# Patient Record
Sex: MALE | Race: WHITE | NOT HISPANIC OR LATINO | Employment: OTHER | URBAN - NONMETROPOLITAN AREA
[De-identification: names, ages, dates, MRNs, and addresses within clinical notes are randomized per-mention and may not be internally consistent; named-entity substitution may affect disease eponyms.]

---

## 2023-03-08 ENCOUNTER — OFFICE VISIT (OUTPATIENT)
Dept: CARDIOLOGY | Facility: CLINIC | Age: 58
End: 2023-03-08
Payer: MEDICARE

## 2023-03-08 VITALS
OXYGEN SATURATION: 92 % | SYSTOLIC BLOOD PRESSURE: 131 MMHG | BODY MASS INDEX: 26.26 KG/M2 | WEIGHT: 204.6 LBS | HEIGHT: 74 IN | DIASTOLIC BLOOD PRESSURE: 79 MMHG | HEART RATE: 103 BPM

## 2023-03-08 DIAGNOSIS — I10 ESSENTIAL HYPERTENSION: ICD-10-CM

## 2023-03-08 DIAGNOSIS — R07.2 PRECORDIAL PAIN: ICD-10-CM

## 2023-03-08 DIAGNOSIS — I49.1 PREMATURE ATRIAL COMPLEXES: ICD-10-CM

## 2023-03-08 DIAGNOSIS — F17.200 SMOKER: ICD-10-CM

## 2023-03-08 DIAGNOSIS — R06.02 SHORTNESS OF BREATH: Primary | ICD-10-CM

## 2023-03-08 DIAGNOSIS — E78.2 MIXED HYPERLIPIDEMIA: ICD-10-CM

## 2023-03-08 DIAGNOSIS — R60.0 BILATERAL LEG EDEMA: ICD-10-CM

## 2023-03-08 DIAGNOSIS — R00.2 PALPITATIONS: ICD-10-CM

## 2023-03-08 PROBLEM — J44.9 COPD (CHRONIC OBSTRUCTIVE PULMONARY DISEASE): Status: ACTIVE | Noted: 2023-03-08

## 2023-03-08 PROBLEM — K21.9 GERD (GASTROESOPHAGEAL REFLUX DISEASE): Status: ACTIVE | Noted: 2023-03-08

## 2023-03-08 PROCEDURE — 99204 OFFICE O/P NEW MOD 45 MIN: CPT | Performed by: SPECIALIST

## 2023-03-08 PROCEDURE — 3075F SYST BP GE 130 - 139MM HG: CPT | Performed by: SPECIALIST

## 2023-03-08 PROCEDURE — 93000 ELECTROCARDIOGRAM COMPLETE: CPT | Performed by: SPECIALIST

## 2023-03-08 PROCEDURE — 3078F DIAST BP <80 MM HG: CPT | Performed by: SPECIALIST

## 2023-03-08 RX ORDER — CETIRIZINE HYDROCHLORIDE 10 MG/1
10 TABLET ORAL DAILY
COMMUNITY

## 2023-03-08 RX ORDER — PREDNISONE 1 MG/1
5 TABLET ORAL DAILY PRN
COMMUNITY
Start: 2023-02-10

## 2023-03-08 RX ORDER — OMEPRAZOLE 40 MG/1
CAPSULE, DELAYED RELEASE ORAL DAILY
COMMUNITY
Start: 2023-03-02

## 2023-03-08 RX ORDER — ERGOCALCIFEROL 1.25 MG/1
CAPSULE ORAL WEEKLY
COMMUNITY
Start: 2023-02-10

## 2023-03-08 RX ORDER — IPRATROPIUM/ALBUTEROL SULFATE 20-100 MCG
MIST INHALER (GRAM) INHALATION 4 TIMES DAILY
COMMUNITY

## 2023-03-08 RX ORDER — BUPROPION HYDROCHLORIDE 100 MG/1
100 TABLET, EXTENDED RELEASE ORAL 2 TIMES DAILY
Qty: 180 TABLET | Refills: 1 | Status: SHIPPED | OUTPATIENT
Start: 2023-03-08

## 2023-03-08 RX ORDER — ALBUTEROL SULFATE 2.5 MG/3ML
2.5 SOLUTION RESPIRATORY (INHALATION) EVERY 4 HOURS PRN
COMMUNITY
End: 2023-03-08

## 2023-03-08 RX ORDER — LOSARTAN POTASSIUM AND HYDROCHLOROTHIAZIDE 12.5; 1 MG/1; MG/1
TABLET ORAL DAILY
COMMUNITY
Start: 2023-03-02

## 2023-03-08 RX ORDER — IPRATROPIUM BROMIDE AND ALBUTEROL SULFATE 2.5; .5 MG/3ML; MG/3ML
SOLUTION RESPIRATORY (INHALATION) 3 TIMES DAILY PRN
COMMUNITY
Start: 2023-03-02 | End: 2023-03-08 | Stop reason: ALTCHOICE

## 2023-03-08 RX ORDER — SILDENAFIL CITRATE 20 MG/1
20 TABLET ORAL
COMMUNITY

## 2023-03-08 RX ORDER — ALBUTEROL SULFATE 90 UG/1
2 AEROSOL, METERED RESPIRATORY (INHALATION) EVERY 4 HOURS PRN
COMMUNITY

## 2023-03-08 RX ORDER — ROSUVASTATIN CALCIUM 20 MG/1
20 TABLET, COATED ORAL DAILY
Qty: 90 TABLET | Refills: 3 | Status: SHIPPED | OUTPATIENT
Start: 2023-03-08

## 2023-03-08 RX ORDER — FLUTICASONE FUROATE, UMECLIDINIUM BROMIDE AND VILANTEROL TRIFENATATE 100; 62.5; 25 UG/1; UG/1; UG/1
1 POWDER RESPIRATORY (INHALATION) DAILY
COMMUNITY
Start: 2023-01-31

## 2023-03-08 NOTE — PROGRESS NOTES
Subjective   Initial consultation, shortness of breath, chest pain, palpitations  Andrew Mcmahon is a 57 y.o. male who presents to day for Establish Care (Here for eval. ), Chest Pain, Shortness of Breath, Hyperlipidemia, and Hypertension.    CHIEF COMPLIANT  Chief Complaint   Patient presents with   • Establish Care     Here for eval.    • Chest Pain   • Shortness of Breath   • Hyperlipidemia   • Hypertension     Problem List:    1. HTN  2. Shortness of breath  3. GERD  4. COPD  5. Hyperlipidemia  6. Smoker    Problem List Items Addressed This Visit        Cardiac and Vasculature    Essential hypertension    Relevant Medications    losartan-hydrochlorothiazide (HYZAAR) 100-12.5 MG per tablet    Other Relevant Orders    ECG 12 Lead    Mixed hyperlipidemia    Relevant Medications    rosuvastatin (CRESTOR) 20 MG tablet    Palpitations    Relevant Orders    Adult Transthoracic Echo Complete w/ Color, Spectral and Contrast if necessary per protocol    Cardiac Event Monitor    Premature atrial complexes    Relevant Medications    sildenafil (REVATIO) 20 MG tablet       Pulmonary and Pneumonias    Shortness of breath - Primary    Relevant Orders    ECG 12 Lead    Stress Test With Myocardial Perfusion One Day    Adult Transthoracic Echo Complete w/ Color, Spectral and Contrast if necessary per protocol    Ambulatory Referral to Pulmonology       Symptoms and Signs    Bilateral leg edema    Relevant Orders    Stress Test With Myocardial Perfusion One Day    Adult Transthoracic Echo Complete w/ Color, Spectral and Contrast if necessary per protocol       Tobacco    Smoker    Relevant Medications    buPROPion SR (Wellbutrin SR) 100 MG 12 hr tablet   Other Visit Diagnoses     Precordial pain        Relevant Orders    ECG 12 Lead    Stress Test With Myocardial Perfusion One Day    Adult Transthoracic Echo Complete w/ Color, Spectral and Contrast if necessary per protocol          HPI  Patient has been having shortness of breath  for years but since he had COVID about 2 years ago this has been progressing steadily, now he can maybe walk half a block quite short of breath class III with intermittent pedal edema he also gets chest discomfort and tightness but not with exertion he could be seated but he notices that if he is more short of breath he will get the chest discomfort too, he also occasionally feels his heart is beating and pounding he denied skipping  specifically, he smoked for little bit over 40 years about 2 packs/day he has history of hypertension and he is prediabetic also has hyperlipidemia, no significant cardiac family history except few uncles from both paternal and maternal side with heart attacks also his mother had valve replacement for a leaky valve                    PRIOR MEDS  Current Outpatient Medications on File Prior to Visit   Medication Sig Dispense Refill   • albuterol sulfate  (90 Base) MCG/ACT inhaler Inhale 2 puffs Every 4 (Four) Hours As Needed for Wheezing.     • cetirizine (zyrTEC) 10 MG tablet Take 1 tablet by mouth Daily.     • ipratropium-albuterol (Combivent Respimat)  MCG/ACT inhaler 4 (Four) Times a Day.     • losartan-hydrochlorothiazide (HYZAAR) 100-12.5 MG per tablet Daily.     • omeprazole (priLOSEC) 40 MG capsule Daily.     • predniSONE (DELTASONE) 5 MG tablet Take 1 tablet by mouth Daily As Needed.     • sildenafil (REVATIO) 20 MG tablet Take 1 tablet by mouth. For ED     • vitamin D (ERGOCALCIFEROL) 1.25 MG (95147 UT) capsule capsule 1 (One) Time Per Week.     • [DISCONTINUED] albuterol (PROVENTIL) (2.5 MG/3ML) 0.083% nebulizer solution Take 2.5 mg by nebulization Every 4 (Four) Hours As Needed.     • Trelegy Ellipta 100-62.5-25 MCG/ACT inhaler 1 puff Daily.     • [DISCONTINUED] ipratropium-albuterol (DUO-NEB) 0.5-2.5 mg/3 ml nebulizer 3 (Three) Times a Day As Needed.       No current facility-administered medications on file prior to visit.       ALLERGIES  Codeine, Nyquil hbp  "cold & flu [dm-doxylamine-acetaminophen], and Doxycycline    HISTORY  Past Medical History:   Diagnosis Date   • Borderline diabetes    • COPD (chronic obstructive pulmonary disease) (HCC)    • Edema of both lower extremities    • Essential hypertension    • GERD (gastroesophageal reflux disease)    • Hyperlipidemia    • Shortness of breath        Social History     Socioeconomic History   • Marital status:    Tobacco Use   • Smoking status: Every Day     Types: Cigarettes   • Smokeless tobacco: Former   • Tobacco comments:     Smoking approx. 2 PPD since approx. 7 yrs. old   Substance and Sexual Activity   • Alcohol use: Never   • Drug use: Never       Family History   Problem Relation Age of Onset   • Hypertension Mother    • Valvular heart disease Mother    • Skin cancer Mother    • No Known Problems Father        Review of Systems   Constitutional: Positive for fatigue (off and on).   HENT: Negative.    Eyes: Visual disturbance: glasses prn.   Respiratory: Positive for shortness of breath (HX COPD).    Cardiovascular: Positive for chest pain and leg swelling (feet/toes). Negative for palpitations.   Gastrointestinal: Negative.    Endocrine: Negative.    Genitourinary: Negative.    Musculoskeletal: Positive for arthralgias and myalgias.   Skin: Negative.    Allergic/Immunologic: Positive for environmental allergies.   Neurological: Negative.    Hematological: Bruises/bleeds easily.   Psychiatric/Behavioral: Positive for sleep disturbance.       Objective     VITALS: /79 (BP Location: Left arm, Patient Position: Sitting)   Pulse 103   Ht 186.9 cm (73.6\")   Wt 92.8 kg (204 lb 9.6 oz)   SpO2 92%   BMI 26.56 kg/m²     LABS:   Lab Results (most recent)     None          IMAGING:   No Images in the past 120 days found..    EXAM:  Physical Exam  Constitutional:       Appearance: He is well-developed.   HENT:      Head: Normocephalic and atraumatic.   Eyes:      Pupils: Pupils are equal, round, and " reactive to light.   Neck:      Thyroid: No thyromegaly.      Vascular: No JVD.   Cardiovascular:      Rate and Rhythm: Normal rate and regular rhythm.      Chest Wall: PMI is not displaced.      Pulses: Normal pulses.      Heart sounds: Normal heart sounds, S1 normal and S2 normal. No murmur heard.    No friction rub. No gallop.   Pulmonary:      Effort: Pulmonary effort is normal. No respiratory distress.      Breath sounds: Normal breath sounds. No stridor. No wheezing or rales.   Chest:      Chest wall: No tenderness.   Abdominal:      General: Bowel sounds are normal. There is no distension.      Palpations: Abdomen is soft. There is no mass.      Tenderness: There is no abdominal tenderness. There is no guarding or rebound.   Musculoskeletal:      Cervical back: Neck supple. No edema.   Skin:     General: Skin is warm and dry.      Coloration: Skin is not pale.      Findings: No erythema or rash.   Neurological:      Mental Status: He is alert and oriented to person, place, and time.      Cranial Nerves: No cranial nerve deficit.      Coordination: Coordination normal.   Psychiatric:         Behavior: Behavior normal.         Procedure     ECG 12 Lead    Date/Time: 3/8/2023 1:58 PM  Performed by: Chrissie Swain MD  Authorized by: Chrissie Swain MD   Comparison: not compared with previous ECG   Previous ECG: no previous ECG available          EKG sinus tachycardia with heart rate of 106 with right axis deviation and septal infarction pattern with frequent premature atrial complexes no previous EKGs available for comparison       Assessment & Plan     Diagnoses and all orders for this visit:    1. Shortness of breath (Primary)  -     ECG 12 Lead  -     Stress Test With Myocardial Perfusion One Day; Future  -     Adult Transthoracic Echo Complete w/ Color, Spectral and Contrast if necessary per protocol; Future  -     Ambulatory Referral to Pulmonology    2. Essential hypertension  -     ECG 12 Lead    3. Mixed  hyperlipidemia  -     rosuvastatin (CRESTOR) 20 MG tablet; Take 1 tablet by mouth Daily.  Dispense: 90 tablet; Refill: 3    4. Bilateral leg edema  -     Stress Test With Myocardial Perfusion One Day; Future  -     Adult Transthoracic Echo Complete w/ Color, Spectral and Contrast if necessary per protocol; Future    5. Smoker  -     buPROPion SR (Wellbutrin SR) 100 MG 12 hr tablet; Take 1 tablet by mouth 2 (Two) Times a Day.  Dispense: 180 tablet; Refill: 1    6. Precordial pain  -     ECG 12 Lead  -     Stress Test With Myocardial Perfusion One Day; Future  -     Adult Transthoracic Echo Complete w/ Color, Spectral and Contrast if necessary per protocol; Future    7. Palpitations  -     Adult Transthoracic Echo Complete w/ Color, Spectral and Contrast if necessary per protocol; Future  -     Cardiac Event Monitor; Future    8. Premature atrial complexes    1.  He is quite short of breath I suspect combination of significant COPD plus or minus cardiac cause including possible ischemia some going to go ahead and do a stress testing for assessment of ischemia also get an echocardiogram to assess cardiac function wall motion and valve morphology  2.  He has frequent PACs the EKG with right axis deviation consistent with pulmonary pressure issues so I am going to get an event monitor also I am going to refer him to pulmonology for assessment of his lung functions  3.  I reviewed his labs from his primary care physician which was done back on February 1 and that showed significantly elevated LDL of 158 with HDL of 55 and triglycerides of 105 I am going to start him on rosuvastatin 20 mg/day  4.  Currently no clinical CHF continue current management  5.  His blood pressures controlled continue current management  6.  I reviewed his hemoglobin A1c was 6.3 he is prediabetic we talked about dieting and cutting down carbohydrate intake but also suggested he should talk to his primary caregiver about taking metformin as he is  quite close to develop full diabetes  7.  Strongly advised him to quit smoking immediately I started him on Wellbutrin    Return After stress test.                 MEDS ORDERED DURING VISIT:  New Medications Ordered This Visit   Medications   • buPROPion SR (Wellbutrin SR) 100 MG 12 hr tablet     Sig: Take 1 tablet by mouth 2 (Two) Times a Day.     Dispense:  180 tablet     Refill:  1   • rosuvastatin (CRESTOR) 20 MG tablet     Sig: Take 1 tablet by mouth Daily.     Dispense:  90 tablet     Refill:  3       As always, Diogo Lynn, NP-C  I appreciate very much the opportunity to participate in the cardiovascular care of your patients. Please do not hesitate to call me with any questions with regards to Andrew Mcmahon evaluation and management.         This document has been electronically signed by Chrissie Swain MD  March 8, 2023 14:50 EST    This note is dictated utilizing voice recognition software.

## 2023-03-09 ENCOUNTER — TELEPHONE (OUTPATIENT)
Dept: CARDIOLOGY | Facility: CLINIC | Age: 58
End: 2023-03-09
Payer: MEDICARE

## 2023-03-09 NOTE — TELEPHONE ENCOUNTER
Patient called reporting his monitor has been beeping with poor skin contact. He has used all but 2 of the monitor strips trying to get it to work. Advised he can come by the office for assistance and extra strips or he can call the 800 number on the box to have strips mailed. He may try repositioning the monitor across left breast like instruction book shows ensuring the arrow is pointing up. He if continues to have problems he will come in office for assistance.

## 2023-03-10 ENCOUNTER — PATIENT ROUNDING (BHMG ONLY) (OUTPATIENT)
Dept: CARDIOLOGY | Facility: CLINIC | Age: 58
End: 2023-03-10
Payer: MEDICARE

## 2023-03-10 NOTE — PROGRESS NOTES
March 10, 2023    Hello, may I speak with Andrew Mcmahon?    My name is KATYA CUNHA      [facility] am  with [facility]  [address] [place] [id number] [phone].    Before we get started may I verify your date of birth? 1965    I am calling to officially welcome you to our practice and ask about your recent visit. Is this a good time to talk? yes    Tell me about your visit with us. What things went well?  [employer]       We're always looking for ways to make our patients' experiences even better. Do you have recommendations on ways we may improve?  no.  ALMA ROSA DONE A WONDERFUL JOB    Overall were you satisfied with your first visit to our practice? yes       I appreciate you taking the time to speak with me today. Is there anything else I can do for you? Yes.  PT IS HAVING DIFFICULTY WEARING THE MONITOR.        Thank you, and have a great day.

## 2023-03-13 ENCOUNTER — TELEPHONE (OUTPATIENT)
Dept: CARDIOLOGY | Facility: CLINIC | Age: 58
End: 2023-03-13
Payer: MEDICARE

## 2023-03-13 NOTE — TELEPHONE ENCOUNTER
Caller: Andrew Mcmahon    Relationship: Self    Best call back number: [phone]    What is the best time to reach you: ANY    Who are you requesting to speak with (clinical staff, provider,  specific staff member): CLINICAL    What was the call regarding: PT CALLED ON 03.09.23 AND SPOKE WITH [facility] WITH CLINICAL TEAM RE HIS MONITOR.PT CALLING AGAIN AS ISSUE WAS NOT RESOLVED. SKIN ALSO BROKE OUT WITH ADHESIVE USE. HUB STAFF ADVISED TO KEEP MONITOR AT HOME UNTIL HE IS ADVISED ON NEXT STEPS BY CLINICAL STAFF.     Do you require a callback: YES

## 2023-03-13 NOTE — TELEPHONE ENCOUNTER
Patient left message that he took his monitor off and wanted to know what to do with it. Called patient, he states he is still having problems with poor skin contact and now monitor is irritating his skin. He called [employer] but was told there was nothing they could do. He did not want to come in for assistance or try sensitive strips. He was advised to send monitor back.

## 2023-03-13 NOTE — TELEPHONE ENCOUNTER
I did offer sensitive strips, pt states he is too far away to make the trip. He stated he did not want to continue to wear.

## 2023-04-04 ENCOUNTER — HOSPITAL ENCOUNTER (OUTPATIENT)
Dept: CARDIOLOGY | Facility: HOSPITAL | Age: 58
Discharge: HOME | End: 2023-04-04
Payer: MEDICARE

## 2023-04-04 VITALS — WEIGHT: 204.59 LBS | BODY MASS INDEX: 27.11 KG/M2 | HEIGHT: 73 IN

## 2023-04-04 DIAGNOSIS — R07.2 PRECORDIAL PAIN: ICD-10-CM

## 2023-04-04 DIAGNOSIS — R00.2 PALPITATIONS: ICD-10-CM

## 2023-04-04 DIAGNOSIS — R06.02 SHORTNESS OF BREATH: ICD-10-CM

## 2023-04-04 DIAGNOSIS — R60.0 BILATERAL LEG EDEMA: ICD-10-CM

## 2023-04-04 LAB
AORTIC DIMENSIONLESS INDEX: 1 (DI)
BH CV ECHO MEAS - ACS: 1.93 CM
BH CV ECHO MEAS - AO MAX PG: 3.6 MMHG
BH CV ECHO MEAS - AO MEAN PG: 1.97 MMHG
BH CV ECHO MEAS - AO ROOT DIAM: 2.9 CM
BH CV ECHO MEAS - AO V2 MAX: 94.7 CM/SEC
BH CV ECHO MEAS - AO V2 VTI: 15.7 CM
BH CV ECHO MEAS - EDV(CUBED): 106.7 ML
BH CV ECHO MEAS - ESV(CUBED): 33.5 ML
BH CV ECHO MEAS - FS: 32 %
BH CV ECHO MEAS - IVS/LVPW: 1.07 CM
BH CV ECHO MEAS - IVSD: 0.87 CM
BH CV ECHO MEAS - LA DIMENSION: 2.5 CM
BH CV ECHO MEAS - LAT PEAK E' VEL: 7 CM/SEC
BH CV ECHO MEAS - LV MASS(C)D: 132.8 GRAMS
BH CV ECHO MEAS - LV MAX PG: 4 MMHG
BH CV ECHO MEAS - LV MEAN PG: 1.98 MMHG
BH CV ECHO MEAS - LV V1 MAX: 100.3 CM/SEC
BH CV ECHO MEAS - LV V1 VTI: 19.9 CM
BH CV ECHO MEAS - LVIDD: 4.7 CM
BH CV ECHO MEAS - LVIDS: 3.2 CM
BH CV ECHO MEAS - LVPWD: 0.81 CM
BH CV ECHO MEAS - MED PEAK E' VEL: 6.3 CM/SEC
BH CV ECHO MEAS - MV A MAX VEL: 53.4 CM/SEC
BH CV ECHO MEAS - MV DEC SLOPE: 296.5 CM/SEC2
BH CV ECHO MEAS - MV DEC TIME: 0.21 MSEC
BH CV ECHO MEAS - MV E MAX VEL: 39.3 CM/SEC
BH CV ECHO MEAS - MV E/A: 0.74
BH CV ECHO MEAS - MV MAX PG: 3.2 MMHG
BH CV ECHO MEAS - MV MEAN PG: 1.23 MMHG
BH CV ECHO MEAS - MV P1/2T: 63.9 MSEC
BH CV ECHO MEAS - MV V2 VTI: 18.9 CM
BH CV ECHO MEAS - MVA(P1/2T): 3.4 CM2
BH CV ECHO MEAS - PA V2 MAX: 99.5 CM/SEC
BH CV ECHO MEAS - RAP SYSTOLE: 8 MMHG
BH CV ECHO MEAS - RV MAX PG: 2.5 MMHG
BH CV ECHO MEAS - RV V1 MAX: 79.6 CM/SEC
BH CV ECHO MEAS - RV V1 VTI: 15.2 CM
BH CV ECHO MEAS - RVDD: 2.7 CM
BH CV ECHO MEAS - RVSP: 13 MMHG
BH CV ECHO MEAS - TAPSE (>1.6): 2.24 CM
BH CV ECHO MEAS - TR MAX PG: 4.7 MMHG
BH CV ECHO MEAS - TR MAX VEL: 108.2 CM/SEC
BH CV ECHO MEASUREMENTS AVERAGE E/E' RATIO: 5.91
BH CV REST NUCLEAR ISOTOPE DOSE: 10 MCI
BH CV STRESS COMMENTS STAGE 1: NORMAL
BH CV STRESS DOSE REGADENOSON STAGE 1: 0.4
BH CV STRESS DURATION MIN STAGE 1: 0
BH CV STRESS DURATION SEC STAGE 1: 10
BH CV STRESS NUCLEAR ISOTOPE DOSE: 30 MCI
BH CV STRESS PROTOCOL 1: NORMAL
BH CV STRESS RECOVERY BP: NORMAL MMHG
BH CV STRESS RECOVERY HR: 113 BPM
BH CV STRESS STAGE 1: 1
BH CV XLRA - TDI S': 9.7 CM/SEC
LV EF 2D ECHO EST: 60 %
LV EF NUC BP: 62 %
MAXIMAL PREDICTED HEART RATE: 161 BPM
MAXIMAL PREDICTED HEART RATE: 161 BPM
PERCENT MAX PREDICTED HR: 68.32 %
SINUS: 3.2 CM
STRESS BASELINE BP: NORMAL MMHG
STRESS BASELINE HR: 104 BPM
STRESS PERCENT HR: 80 %
STRESS POST PEAK BP: NORMAL MMHG
STRESS POST PEAK HR: 110 BPM
STRESS TARGET HR: 137 BPM
STRESS TARGET HR: 137 BPM

## 2023-04-04 PROCEDURE — 0 TECHNETIUM SESTAMIBI: Performed by: SPECIALIST

## 2023-04-04 PROCEDURE — A9500 TC99M SESTAMIBI: HCPCS | Performed by: SPECIALIST

## 2023-04-04 PROCEDURE — 25010000002 REGADENOSON 0.4 MG/5ML SOLUTION: Performed by: SPECIALIST

## 2023-04-04 PROCEDURE — 93306 TTE W/DOPPLER COMPLETE: CPT | Performed by: SPECIALIST

## 2023-04-04 PROCEDURE — 93018 CV STRESS TEST I&R ONLY: CPT | Performed by: SPECIALIST

## 2023-04-04 PROCEDURE — 93306 TTE W/DOPPLER COMPLETE: CPT

## 2023-04-04 PROCEDURE — 78452 HT MUSCLE IMAGE SPECT MULT: CPT

## 2023-04-04 PROCEDURE — 78452 HT MUSCLE IMAGE SPECT MULT: CPT | Performed by: SPECIALIST

## 2023-04-04 PROCEDURE — 93017 CV STRESS TEST TRACING ONLY: CPT

## 2023-04-04 RX ADMIN — TECHNETIUM TC 99M SESTAMIBI 1 DOSE: 1 INJECTION INTRAVENOUS at 08:15

## 2023-04-04 RX ADMIN — TECHNETIUM TC 99M SESTAMIBI 1 DOSE: 1 INJECTION INTRAVENOUS at 09:47

## 2023-04-04 RX ADMIN — REGADENOSON 0.4 MG: 0.08 INJECTION, SOLUTION INTRAVENOUS at 09:46

## 2023-05-11 ENCOUNTER — OFFICE VISIT (OUTPATIENT)
Dept: PULMONOLOGY | Facility: CLINIC | Age: 58
End: 2023-05-11
Payer: MEDICARE

## 2023-05-11 ENCOUNTER — PATIENT ROUNDING (BHMG ONLY) (OUTPATIENT)
Dept: PULMONOLOGY | Facility: CLINIC | Age: 58
End: 2023-05-11
Payer: MEDICARE

## 2023-05-11 VITALS
HEIGHT: 73 IN | HEART RATE: 99 BPM | BODY MASS INDEX: 26.24 KG/M2 | WEIGHT: 198 LBS | OXYGEN SATURATION: 93 % | DIASTOLIC BLOOD PRESSURE: 77 MMHG | SYSTOLIC BLOOD PRESSURE: 134 MMHG

## 2023-05-11 DIAGNOSIS — R06.02 SHORTNESS OF BREATH: Primary | ICD-10-CM

## 2023-05-11 DIAGNOSIS — F17.218 CIGARETTE NICOTINE DEPENDENCE WITH OTHER NICOTINE-INDUCED DISORDER: ICD-10-CM

## 2023-05-11 DIAGNOSIS — Z71.6 ENCOUNTER FOR SMOKING CESSATION COUNSELING: ICD-10-CM

## 2023-05-11 DIAGNOSIS — J44.9 CHRONIC OBSTRUCTIVE PULMONARY DISEASE, UNSPECIFIED COPD TYPE: ICD-10-CM

## 2023-05-11 DIAGNOSIS — Z12.2 ENCOUNTER FOR SCREENING FOR LUNG CANCER: ICD-10-CM

## 2023-05-11 RX ORDER — VARENICLINE TARTRATE 1 MG/1
1 TABLET, FILM COATED ORAL 2 TIMES DAILY
Qty: 60 TABLET | Refills: 4 | Status: SHIPPED | OUTPATIENT
Start: 2023-05-11

## 2023-05-11 RX ORDER — VARENICLINE TARTRATE 25 MG
KIT ORAL
Qty: 53 TABLET | Refills: 0 | Status: SHIPPED | OUTPATIENT
Start: 2023-05-11

## 2023-05-11 RX ORDER — BLOOD-GLUCOSE METER
EACH MISCELLANEOUS
COMMUNITY
Start: 2023-03-17

## 2023-05-11 RX ORDER — LEVOFLOXACIN 500 MG/1
TABLET, FILM COATED ORAL
COMMUNITY
Start: 2023-03-09 | End: 2023-05-17

## 2023-05-11 NOTE — PROGRESS NOTES
New Pulmonary Patient Office Visit      Patient Name: Andrew Mcmahon    Referring Physician: Chrissie Swain MD    Chief Complaint:  Short of breath      History of Present Illness: Andrew Mcmahon is a 58 y.o. male who is here today to establish care with Pulmonary for shortness of breath, referred by cardiology. Home pulse ox drops to the 80s per patient report. + chronic right knee pain from prior injury limits mobility, ambulates with a cane. He was started on Trelegy by his PCP, just began using the medication today. Used Wellbutrin in the past but stopped because it made him sleepy. He has nicotine patches at home but hasn't been using them.     Duration: Dyspnea for a couple of years, was told that he had COPD 3-4 years ago  Severity: Can be severe  Timing: Daily  Context: Mild exertion  Associated Symptoms: Occasional cough with procution of phlegm, some wheezing, no fevers, no hemoptysis  Modifying Factors: Worse with exertion  Supplemental Oxygen: No   Ever had Blood Clots: No  Last Steroids: February 2023  Number of exacerbations per year: 4  Smoking history: 2 ppd, started at age 7, now cut back to 1.5 ppd    Subjective      Review of Systems:   Review of Systems   Constitutional: Negative for fever and unexpected weight change.   Respiratory: Positive for cough, shortness of breath and wheezing.    Cardiovascular: Negative for chest pain and leg swelling.   Musculoskeletal: Positive for arthralgias.        Past Medical History:   Past Medical History:   Diagnosis Date   • Borderline diabetes    • COPD (chronic obstructive pulmonary disease)    • Edema of both lower extremities    • Essential hypertension    • GERD (gastroesophageal reflux disease)    • Hyperlipidemia    • Shortness of breath        Past Surgical History:   Past Surgical History:   Procedure Laterality Date   • APPENDECTOMY     • CATARACT EXTRACTION     • CHOLECYSTECTOMY     • LEG SURGERY      x3   • STOMACH SURGERY      ulcer  "      Family History:   Family History   Problem Relation Age of Onset   • Hypertension Mother    • Valvular heart disease Mother    • Skin cancer Mother    • No Known Problems Father        Social History:   Social History     Socioeconomic History   • Marital status:    Tobacco Use   • Smoking status: Every Day     Types: Cigarettes   • Smokeless tobacco: Former   • Tobacco comments:     Smoking approx. 2 PPD since approx. 7 yrs. old   Substance and Sexual Activity   • Alcohol use: Never   • Drug use: Never       Medications:     Current Outpatient Medications:   •  albuterol sulfate  (90 Base) MCG/ACT inhaler, Inhale 2 puffs Every 4 (Four) Hours As Needed for Wheezing., Disp: , Rfl:   •  Blood Glucose Monitoring Suppl (ONE TOUCH ULTRA 2) w/Device kit, , Disp: , Rfl:   •  cetirizine (zyrTEC) 10 MG tablet, Take 1 tablet by mouth Daily., Disp: , Rfl:   •  ipratropium-albuterol (Combivent Respimat)  MCG/ACT inhaler, 4 (Four) Times a Day., Disp: , Rfl:   •  losartan-hydrochlorothiazide (HYZAAR) 100-12.5 MG per tablet, Daily., Disp: , Rfl:   •  omeprazole (priLOSEC) 40 MG capsule, Daily., Disp: , Rfl:   •  rosuvastatin (CRESTOR) 20 MG tablet, Take 1 tablet by mouth Daily., Disp: 90 tablet, Rfl: 3  •  sildenafil (REVATIO) 20 MG tablet, Take 1 tablet by mouth. For ED, Disp: , Rfl:   •  Trelegy Ellipta 100-62.5-25 MCG/ACT inhaler, 1 puff Daily., Disp: , Rfl:   •  vitamin D (ERGOCALCIFEROL) 1.25 MG (94053 UT) capsule capsule, 1 (One) Time Per Week., Disp: , Rfl:     Allergies:   Allergies   Allergen Reactions   • Codeine Hives   • Nyquil Hbp Cold & Flu [Dm-Doxylamine-Acetaminophen] Hives   • Doxycycline Rash       Objective     Physical Exam:  Vital Signs:   Vitals:    05/11/23 1311   BP: 134/77   BP Location: Left arm   Patient Position: Sitting   Cuff Size: Adult   Pulse: 99   SpO2: 93%   Weight: 89.8 kg (198 lb)   Height: 185.4 cm (73\")     Body mass index is 26.12 " kg/m².  ============================  ============================    6 MINUTE WALK TEST    Andrew Mcmahon   1965             BASELINE   SpO2%: 93% RA    Heart Rate 99   Blood Pressure 134/77     EXERCISE SpO2% HEART RATE RA or O2 @ LPM   1 MINUTE 93 99 RA   2 MINUTES 93 106 RA   3 MINUTES 90 103 RA   4 MINUTES 88 110 RA   5 MINUTES 97 93 2 LPM   6 MINUTES 96 86 2 LPM   (Number of laps: 5  X 36 meters + Final partial lap: meters =  meters)            Distance Walked: 180 Meters   SpO2% Post Exercise: 96%   HR Post Exercise: 98     Reason to stop (if applicable):   ____ Chest Pain   ____ Light Headedness   ____ Dyspnea Unrelieved by Rest   ____ Abnormal Gait Pattern   ____ Severe Fatigue   ____ Other (Specify: __________________________)    Tech Comments (if any):     Test performed by: AM    ============================  ============================    Physical Exam  Vitals reviewed.   Constitutional:       General: He is not in acute distress.     Appearance: He is not toxic-appearing.   HENT:      Head: Normocephalic and atraumatic.      Right Ear: External ear normal.      Left Ear: External ear normal.      Nose: Nose normal.      Mouth/Throat:      Mouth: Mucous membranes are moist.   Eyes:      Extraocular Movements: Extraocular movements intact.      Conjunctiva/sclera: Conjunctivae normal.      Pupils: Pupils are equal, round, and reactive to light.   Cardiovascular:      Rate and Rhythm: Normal rate.      Heart sounds: Normal heart sounds.   Pulmonary:      Effort: Pulmonary effort is normal.      Breath sounds: Normal breath sounds.   Abdominal:      General: There is no distension.      Palpations: Abdomen is soft.   Musculoskeletal:      Cervical back: Neck supple.      Comments: Ambulates with a cane   Skin:     General: Skin is warm and dry.      Findings: No rash.   Neurological:      General: No focal deficit present.      Mental Status: He is alert and oriented to person, place, and time.    Psychiatric:         Mood and Affect: Mood normal.         Behavior: Behavior normal.       Results Review:   Results for orders placed during the hospital encounter of 04/04/23    Adult Transthoracic Echo Complete w/ Color, Spectral and Contrast if necessary per protocol    Interpretation Summary  •  Left ventricular systolic function is normal. Left ventricular ejection fraction appears to be 56 - 60%.  •  Left ventricular diastolic function is consistent with (grade I) impaired relaxation.  •  Estimated right ventricular systolic pressure from tricuspid regurgitation is normal (<35 mmHg).    Assessment / Plan      Assessment/Plan:    Diagnoses and all orders for this visit:    1. Shortness of breath (Primary)  -     Pulmonary Function Test; Future  -     6 Minute Walk Test; Future  -     Oxygen Therapy  Further assess with full PFTs.  Walk test performed today revealed the need for supplemental oxygen. Rx written for portable oxygen concentrator for improved mobility. The patient was educated on the risk of fire/explosion and associated harm to self or others if open flame, including that from cigarettes if near a supplemental oxygen source. Patient voiced understanding.     2. Chronic obstructive pulmonary disease, unspecified COPD type  -     Alpha - 1 - Antitrypsin Deficiency; Future  -     Oxygen Therapy  Continue current inhaled regimen. We discussed the risk and benefits of inhaled corticosteroids. Patient instructed to take them on a regular basis as prescribed. Patient instructed to rinse their mouth out after each use.     3. Cigarette nicotine dependence with other nicotine-induced disorder  -     Varenicline Tartrate 0.5 MG X 11 & 1 MG X 42 tablet; Use as directed on package instructions, try to quit smoking after 1 week  Dispense: 53 tablet; Refill: 0  -     varenicline (CHANTIX) 1 MG tablet; Take 1 tablet by mouth 2 (Two) Times a Day.  Dispense: 60 tablet; Refill: 4  -      CT Chest Low Dose Cancer  Screening WO; Future  Complete cessation advised. Discussed possible side effects of medications. Risk and benefits of the medication were discussed with patient.  Patient encouraged to utilize the nicotine patches that he has at home in addition to Chantix.    4. Encounter for screening for lung cancer  -      CT Chest Low Dose Cancer Screening WO; Future  Patient with greater than 30-pack-year smoking history, and therefore low-dose lung cancer screening is recommended.  Shared decision making counseling including risks and benefits of low-dose lung cancer screening, follow-up diagnostic testing that may be indicated and how comorbid conditions would affect diagnosis/treatment, false positive rates, and total radiation exposure.  Patient wishes to undergo screening, seen as they would be willing to seek diagnosis and treatment if necessitated by results.    5. Encounter for smoking cessation counseling  Tobacco cessation counseling for greater than 10 minutes. Patient is currently interested in quitting. The plan is to use Chantix / Nicotine replacement with goal quit date in 4 to 6 weeks.  Resources for patient support discussed.  Plan to readdress at next visit.       Follow Up:   Return in about 3 months (around 8/11/2023) for Recheck, Follow up after testing complete.  The patient was counseled on diagnostic results, risks and benefits of treatment options, risk factor modifications and the importance of treatment compliance. The patient was advised to contact the clinic with concerns or worsening symptoms.     SANJAY Sparrow  Pulmonary Medicine [place]

## 2023-05-22 DIAGNOSIS — J44.9 CHRONIC OBSTRUCTIVE PULMONARY DISEASE, UNSPECIFIED COPD TYPE: ICD-10-CM

## 2023-06-09 ENCOUNTER — TELEPHONE (OUTPATIENT)
Dept: PULMONOLOGY | Facility: CLINIC | Age: 58
End: 2023-06-09
Payer: MEDICARE

## 2023-06-09 NOTE — TELEPHONE ENCOUNTER
Patient states that he is going to call and schedule his CT of the chest next week. Patient still wants to get it done.

## 2023-06-19 ENCOUNTER — OFFICE VISIT (OUTPATIENT)
Dept: CARDIOLOGY | Facility: CLINIC | Age: 58
End: 2023-06-19
Payer: MEDICARE

## 2023-06-19 VITALS
BODY MASS INDEX: 25.05 KG/M2 | SYSTOLIC BLOOD PRESSURE: 139 MMHG | WEIGHT: 189 LBS | HEIGHT: 73 IN | HEART RATE: 56 BPM | OXYGEN SATURATION: 92 % | DIASTOLIC BLOOD PRESSURE: 68 MMHG

## 2023-06-19 DIAGNOSIS — R60.0 BILATERAL LEG EDEMA: ICD-10-CM

## 2023-06-19 DIAGNOSIS — E78.2 MIXED HYPERLIPIDEMIA: ICD-10-CM

## 2023-06-19 DIAGNOSIS — I10 ESSENTIAL HYPERTENSION: Primary | ICD-10-CM

## 2023-06-19 DIAGNOSIS — R00.2 PALPITATIONS: ICD-10-CM

## 2023-06-19 DIAGNOSIS — F17.200 SMOKER: ICD-10-CM

## 2023-06-19 DIAGNOSIS — R06.02 SHORTNESS OF BREATH: ICD-10-CM

## 2023-06-19 LAB
BH CV REST NUCLEAR ISOTOPE DOSE: 10 MCI
BH CV STRESS COMMENTS STAGE 1: NORMAL
BH CV STRESS DOSE REGADENOSON STAGE 1: 0.4
BH CV STRESS DURATION MIN STAGE 1: 0
BH CV STRESS DURATION SEC STAGE 1: 10
BH CV STRESS NUCLEAR ISOTOPE DOSE: 30 MCI
BH CV STRESS PROTOCOL 1: NORMAL
BH CV STRESS RECOVERY BP: NORMAL MMHG
BH CV STRESS RECOVERY HR: 113 BPM
BH CV STRESS STAGE 1: 1
LV EF NUC BP: 62 %
MAXIMAL PREDICTED HEART RATE: 161 BPM
PERCENT MAX PREDICTED HR: 68.32 %
STRESS BASELINE BP: NORMAL MMHG
STRESS BASELINE HR: 104 BPM
STRESS PERCENT HR: 80 %
STRESS POST PEAK BP: NORMAL MMHG
STRESS POST PEAK HR: 110 BPM
STRESS TARGET HR: 137 BPM

## 2023-06-19 PROCEDURE — 3078F DIAST BP <80 MM HG: CPT | Performed by: SPECIALIST

## 2023-06-19 PROCEDURE — 99214 OFFICE O/P EST MOD 30 MIN: CPT | Performed by: SPECIALIST

## 2023-06-19 PROCEDURE — 3075F SYST BP GE 130 - 139MM HG: CPT | Performed by: SPECIALIST

## 2023-06-19 RX ORDER — ROSUVASTATIN CALCIUM 20 MG/1
20 TABLET, COATED ORAL DAILY
Qty: 90 TABLET | Refills: 3 | Status: SHIPPED | OUTPATIENT
Start: 2023-06-19

## 2023-06-19 RX ORDER — IPRATROPIUM BROMIDE AND ALBUTEROL SULFATE 2.5; .5 MG/3ML; MG/3ML
SOLUTION RESPIRATORY (INHALATION)
COMMUNITY
Start: 2023-06-05

## 2023-06-19 NOTE — PROGRESS NOTES
Subjective   Follow up, shortness of breath  Andrew Mcmahon is a 58 y.o. male who presents to day for Follow-up (Here for testing and monitor f/u), Hyperlipidemia, Hypertension, Palpitations, and Shortness of Breath.    CHIEF COMPLIANT  Chief Complaint   Patient presents with    Follow-up     Here for testing and monitor f/u    Hyperlipidemia    Hypertension    Palpitations    Shortness of Breath     Problem List:     HTN  Shortness of breath  GERD  COPD  Hyperlipidemia  Smoker  Stress test, 4-4-2023, low risk, no ischemia  Echo, 4-4-2023, EF 56-60%, grade 1 DD, pulm. Pressures < 35 mmHg  Event monitor, 3-8-2023 - 3-. SR, SB, ST, PAC'S    Problem List Items Addressed This Visit          Cardiac and Vasculature    Essential hypertension - Primary    Mixed hyperlipidemia    Relevant Medications    rosuvastatin (CRESTOR) 20 MG tablet    Other Relevant Orders    Lipid Panel    Comprehensive Metabolic Panel    Palpitations       Pulmonary and Pneumonias    Shortness of breath       Symptoms and Signs    Bilateral leg edema       Tobacco    Smoker       HPI  His shortness of breath has some improvement since his last seen no recent chest pain or palpitations no edema he is still not completely quit his smoking is because he is little bit nervous but otherwise in general he is doing better                  PRIOR MEDS  Current Outpatient Medications on File Prior to Visit   Medication Sig Dispense Refill    albuterol sulfate  (90 Base) MCG/ACT inhaler Inhale 2 puffs Every 4 (Four) Hours As Needed for Wheezing.      Blood Glucose Monitoring Suppl (ONE TOUCH ULTRA 2) w/Device kit       cetirizine (zyrTEC) 10 MG tablet Take 1 tablet by mouth Daily.      ipratropium-albuterol (DUO-NEB) 0.5-2.5 mg/3 ml nebulizer prn      losartan-hydrochlorothiazide (HYZAAR) 100-12.5 MG per tablet Daily.      omeprazole (priLOSEC) 40 MG capsule Daily.      sildenafil (REVATIO) 20 MG tablet Take 1 tablet by mouth. For ED      Trelegy  Ellipta 100-62.5-25 MCG/ACT inhaler 1 puff Daily.      [DISCONTINUED] ipratropium-albuterol (Combivent Respimat)  MCG/ACT inhaler 4 (Four) Times a Day.      [DISCONTINUED] rosuvastatin (CRESTOR) 20 MG tablet Take 1 tablet by mouth Daily. 90 tablet 3    [DISCONTINUED] varenicline (CHANTIX) 1 MG tablet Take 1 tablet by mouth 2 (Two) Times a Day. 60 tablet 4    [DISCONTINUED] Varenicline Tartrate 0.5 MG X 11 & 1 MG X 42 tablet Use as directed on package instructions, try to quit smoking after 1 week 53 tablet 0    [DISCONTINUED] vitamin D (ERGOCALCIFEROL) 1.25 MG (48727 UT) capsule capsule 1 (One) Time Per Week.       No current facility-administered medications on file prior to visit.       ALLERGIES  Codeine, Crestor [rosuvastatin], Nyquil hbp cold & flu [dm-doxylamine-acetaminophen], and Doxycycline    HISTORY  Past Medical History:   Diagnosis Date    Borderline diabetes     COPD (chronic obstructive pulmonary disease)     Edema of both lower extremities     Essential hypertension     GERD (gastroesophageal reflux disease)     Hyperlipidemia     Shortness of breath        Social History     Socioeconomic History    Marital status:    Tobacco Use    Smoking status: Every Day     Types: Cigarettes    Smokeless tobacco: Former    Tobacco comments:     Smoking approx. 2 PPD since approx. 7 yrs. old   Substance and Sexual Activity    Alcohol use: Never    Drug use: Never       Family History   Problem Relation Age of Onset    Hypertension Mother     Valvular heart disease Mother     Skin cancer Mother     No Known Problems Father        Review of Systems   Constitutional:  Positive for fatigue.   HENT: Negative.     Eyes:  Positive for visual disturbance (reading glasses).   Respiratory:  Positive for shortness of breath.    Cardiovascular: Negative.    Gastrointestinal: Negative.    Endocrine: Negative.    Genitourinary: Negative.    Musculoskeletal:  Positive for arthralgias and myalgias.   Skin:  "Negative.    Allergic/Immunologic: Positive for environmental allergies.   Neurological: Negative.    Hematological: Negative.    Psychiatric/Behavioral: Negative.       Objective     VITALS: /68 (BP Location: Left arm, Patient Position: Sitting)   Pulse 56   Ht 185.4 cm (72.99\")   Wt 85.7 kg (189 lb)   SpO2 92%   BMI 24.94 kg/m²     LABS:   Lab Results (most recent)       None            IMAGING:   No Images in the past 120 days found..    EXAM:  Physical Exam  Constitutional:       Appearance: He is well-developed.   HENT:      Head: Normocephalic and atraumatic.   Eyes:      Pupils: Pupils are equal, round, and reactive to light.   Neck:      Thyroid: No thyromegaly.      Vascular: No JVD.   Cardiovascular:      Rate and Rhythm: Normal rate and regular rhythm.      Chest Wall: PMI is not displaced.      Pulses: Normal pulses.      Heart sounds: Normal heart sounds, S1 normal and S2 normal. No murmur heard.    No friction rub. No gallop.   Pulmonary:      Effort: Pulmonary effort is normal. No respiratory distress.      Breath sounds: Normal breath sounds. No stridor. No wheezing or rales.   Chest:      Chest wall: No tenderness.   Abdominal:      General: Bowel sounds are normal. There is no distension.      Palpations: Abdomen is soft. There is no mass.      Tenderness: There is no abdominal tenderness. There is no guarding or rebound.   Musculoskeletal:      Cervical back: Neck supple. No edema.   Skin:     General: Skin is warm and dry.      Coloration: Skin is not pale.      Findings: No erythema or rash.   Neurological:      Mental Status: He is alert and oriented to person, place, and time.      Cranial Nerves: No cranial nerve deficit.      Coordination: Coordination normal.   Psychiatric:         Behavior: Behavior normal.       Procedure   Procedures       Assessment & Plan     Diagnoses and all orders for this visit:    1. Essential hypertension (Primary)    2. Palpitations    3. Mixed " hyperlipidemia  -     rosuvastatin (CRESTOR) 20 MG tablet; Take 1 tablet by mouth Daily.  Dispense: 90 tablet; Refill: 3  -     Lipid Panel; Future  -     Comprehensive Metabolic Panel; Future    4. Shortness of breath    5. Bilateral leg edema    6. Smoker    1.  His blood pressure is acceptable continue current management  2.  He denies any more palpitations continue current management  3.  We discussed the results of the stress test did not show any ischemia and echocardiogram showed normal systolic function but with diastolic dysfunction advised him to eat low-salt diet  4.  Again his lipids showed elevated LDL he should be started on a statin with a goal of LDL of 70 or less, I will start him rosuvastatin 20 mg/day  5.  No further edema  6.  Again we talked about quitting smoking he does have nicotine patch but he did not use it yet he will start using it      Return in about 6 months (around 12/19/2023).                 MEDS ORDERED DURING VISIT:  New Medications Ordered This Visit   Medications    rosuvastatin (CRESTOR) 20 MG tablet     Sig: Take 1 tablet by mouth Daily.     Dispense:  90 tablet     Refill:  3       As always, Diogo Lynn APRN  I appreciate very much the opportunity to participate in the cardiovascular care of your patients. Please do not hesitate to call me with any questions with regards to Andrew Mcmahon evaluation and management.         This document has been electronically signed by Chrissie Swain MD  June 19, 2023 16:04 EDT    This note is dictated utilizing voice recognition software.

## 2024-03-12 ENCOUNTER — TELEPHONE (OUTPATIENT)
Dept: PULMONOLOGY | Facility: CLINIC | Age: 59
End: 2024-03-12
Payer: MEDICARE

## 2024-03-12 NOTE — TELEPHONE ENCOUNTER
I called Pt regarding CT scan order. Pt stated he will not be back to our office. He stated he was following with a different pulmonologist. Canceling order.